# Patient Record
Sex: MALE | Race: WHITE | NOT HISPANIC OR LATINO | URBAN - METROPOLITAN AREA
[De-identification: names, ages, dates, MRNs, and addresses within clinical notes are randomized per-mention and may not be internally consistent; named-entity substitution may affect disease eponyms.]

---

## 2019-07-31 ENCOUNTER — OFFICE VISIT - RIVER FALLS (OUTPATIENT)
Dept: FAMILY MEDICINE | Facility: CLINIC | Age: 79
End: 2019-07-31

## 2020-01-01 ENCOUNTER — COMMUNICATION - RIVER FALLS (OUTPATIENT)
Dept: FAMILY MEDICINE | Facility: CLINIC | Age: 80
End: 2020-01-01

## 2022-02-12 VITALS — HEART RATE: 68 BPM | DIASTOLIC BLOOD PRESSURE: 60 MMHG | SYSTOLIC BLOOD PRESSURE: 112 MMHG

## 2022-02-15 NOTE — TELEPHONE ENCOUNTER
---------------------  From: John Velasquez MD   Sent: 2020 11:00:46 AM CST  Subject: patient      Called and spoke with staff at [facility]. Patient had been on hospice due to dystonia. Tested positive for COVID-19 on 20. No further cares requested by family due to hospice status, comfort care continued. Passed away from COVID-19 on 20. Death certificate completed.

## 2022-02-15 NOTE — TELEPHONE ENCOUNTER
---------------------  From: Yelena Inman CMA (eRx Pool ([address]_[place]))   To: John Velasquez MD;     Sent: 2/24/2020 4:54:26 PM CST  Subject: FW: Medication Management   Due Date/Time: 2/25/2020 3:13:00 PM CST     PCP:   [facility]    Medication:   Acetaminophen 500mg  Last Filled:  1/28/2020    Quantity:  60  Refills:      Date of last office visit and reason:     Date of last labs pertaining to condition:      Note:  Refill request from Cely Baird, Patient on hospice and is at [facility]    Return to Clinic order placed?  _    Resource:   _  Phone:   _         ** Patient matched by Yelena Inman CMA on 2/24/2020 4:51:07 PM CST **      ------------------------------------------  From: [facility] [address] [facility]  To: John Velasquez MD  Sent: February 24, 2020 3:13:39 PM CST  Subject: Medication Management  Due: February 25, 2020 3:13:39 PM CST    ** On Hold Pending Signature **  Drug: acetaminophen (acetaminophen 500 mg oral tablet)  TAKE 2 TABS (1000MG) BY MOUTH TWICE DAILY FOR PAIN  Quantity: 60 cap(s)  Days Supply: 30  Refills: 0  Substitutions Allowed  Notes from Pharmacy:     Dispensed Drug: acetaminophen (acetaminophen 500 mg oral tablet)  TAKE 2 TABS (1000MG) BY MOUTH TWICE DAILY FOR PAIN  Quantity: 60 cap(s)  Days Supply: 30  Refills: 0  Substitutions Allowed  Notes from Pharmacy:   ---------------------------------------------------------------  From: John Velasquez MD   To: [facility] [facility]    Sent: 2/24/2020 5:10:49 PM CST  Subject: FW: Medication Management     ** Submitted: **  Complete:acetaminophen (Mapap 500 mg oral tablet)   Signed by John Velasquez MD  2/24/2020 5:10:00 PM    ** Approved with modifications: **  acetaminophen (ACETAMINOPHEN ES 500MG CAPLET)  TAKE 2 TABS (1000MG) BY MOUTH TWICE DAILY FOR PAIN  Qty:  60 cap(s)        Days Supply:  30        Refills:  11          Substitutions Allowed     Route To Pharmacy - [facility] [facility]

## 2022-02-15 NOTE — TELEPHONE ENCOUNTER
Entered by Yelena Inman CMA on August 18, 2020 2:35:34 PM CDT  ---------------------  From: Yelena Inman CMA   To: [facility] [address] [facility]    Sent: 8/18/2020 2:35:34 PM CDT  Subject: Medication Management     ** Submitted: **  Order:acetaminophen (acetaminophen 500 mg oral tablet)  2 tab(s)  Oral  bid  FOR PAIN.  Qty:  60 cap(s)        Days Supply:  3        Refills:  1          Substitutions Allowed     Route To Pharmacy - [facility] [facility]    Signed by Yelena Inman CMA  8/18/2020 7:35:00 PM UT    ** Submitted: **  Complete:acetaminophen (acetaminophen 500 mg oral tablet)   Signed by Yelena Inman CMA  8/18/2020 7:35:00 PM [facility] [[email or web address]]    ** Not Approved:  **  acetaminophen (ACETAMINOPHEN ES 500MG CAPLET)  TAKE 2 TABS (1000MG) BY MOUTH TWICE DAILY FOR PAIN  Qty:  60 cap(s)        Days Supply:  3        Refills:  0          Substitutions Allowed     Route To Pharmacy - [facility] [address] [facility]   Note from Pharmacy:  NEED REFILLS PLEASE--THANK YOU PT IS OUT  Signed by Yelena Inman CMA            ------------------------------------------  From: [place] [id number]  To: John Velasquez MD  Sent: August 18, 2020 2:25:36 PM CDT  Subject: Medication Management  Due: August 12, 2020 4:17:26 PM CDT     ** On Hold Pending Signature **     Drug: acetaminophen (acetaminophen 500 mg oral tablet), TAKE 2 TABS (1000MG) BY MOUTH TWICE DAILY FOR PAIN  Quantity: 60 cap(s)  Days Supply: 3  Refills: 0  Substitutions Allowed  Notes from Pharmacy: NEED REFILLS PLEASE--THANK YOU PT IS OUT     Dispensed Drug: acetaminophen (acetaminophen 500 mg oral tablet), TAKE 2 TABS (1000MG) BY MOUTH TWICE DAILY FOR PAIN  Quantity: 60 cap(s)  Days Supply: 3  Refills: 0  Substitutions Allowed  Notes from Pharmacy: NEED REFILLS PLEASE--THANK YOU PT IS OUT  ------------------------------------------

## 2022-02-15 NOTE — TELEPHONE ENCOUNTER
---------------------  From: Luz Yi CMA (eRx Pool ([id number]_[place]))   To: John Velasquez MD;     Sent: 4/23/2020 8:05:23 AM CDT  Subject: FW: Medication Management   Due Date/Time: 4/23/2020 11:37:00 AM CDT           ------------------------------------------  From: [facility] [address]  To: John Velasquez MD  Sent: April 22, 2020 11:37:45 AM CDT  Subject: Medication Management  Due: April 23, 2020 11:37:45 AM CDT    ** On Hold Pending Signature **  Drug: traMADol (traMADol 50 mg oral tablet)  TAKE 1 TAB BY MOUTH EVERY 6 HOURS AS NEEDED FOR PAIN  Quantity: 120 tab(s)  Days Supply: 8  Refills: 0  Substitutions Allowed  Notes from Pharmacy: NEED NEW SCRIPT PLEASE - THANK YOU    Dispensed Drug: traMADol (traMADol 50 mg oral tablet)  TAKE 1 TAB BY MOUTH EVERY 6 HOURS AS NEEDED FOR PAIN  Quantity: 120 tab(s)  Days Supply: 8  Refills: 0  Substitutions Allowed  Notes from Pharmacy: NEED NEW SCRIPT PLEASE - THANK YOU  ---------------------------------------------------------------  From: John Velasquez MD   To: [facility] [address]    Sent: 4/23/2020 9:14:58 AM CDT  Subject: FW: Medication Management     ** Submitted: **  Complete:traMADol (traMADol 50 mg oral tablet)   Signed by John Velasquez MD  4/23/2020 2:14:00 PM    ** Approved with modifications: **  traMADol (TRAMADOL 50MG TABLET)  TAKE 1 TAB BY MOUTH EVERY 6 HOURS AS NEEDED FOR PAIN  Qty:  120 tab(s)        Days Supply:  8        Refills:  5          Substitutions Allowed     Route To Pharmacy - [id number]   Note from Pharmacy:  NEED NEW SCRIPT PLEASE - THANK YOU

## 2022-02-15 NOTE — PROGRESS NOTES
Patient:   AUSTRUM, DUANE C            MRN: [id number]            FIN: [id number]               Age:   78 years     Sex:  Male     :  1940   Associated Diagnoses:   Contracture of ankle; Dystonia; Contracture of left hand   Author:   John Velasquez MD      Visit Information      Primary Care Provider (PCP):  John Velasquez MD    NPI# [id number]      Chief Complaint   2019 2:48 PM CDT    Going off of Hospice; Face to Face orders needed; look at hands and feet      History of Present Illness   patient is long term resident at [facility]  has advanced dystonia, wheelchair confined with little use of hands or legs  had been on hospice but has been clinically stable recently so hospice is discontinued  comfort care remains the goal of patient and family, significant other is with him today  he is very difficult to transport  staff has noted that left middle finger has developed a deformity, not painful, not swelling, hands are contracted  needs refills of medications, no concerns about current medications      Health Status   Allergies:    Allergic Reactions (All)  Severity Not Documented  Penicillin (No reactions were documented)   Medications:  (Selected)   Prescriptions  Prescribed  ACETAMINOPHEN 500 MG TABLET: 2 tab(s), Oral, bid, Instructions: FOR PAIN., # 120 tab(s), 11 Refill(s), Type: Soft Stop, Pharmacy: [facility] [address] [place] [address]  GlycoLax oral powder for reconstitution: See Instructions, Instructions: DISSOLVE 17 GRAMS IN 8oz OF LIQUID AND TAKE BY MOUTH ONCE DAILY, # 527 gm, 1 Refill(s), Type: Maintenance, Pharmacy: [facility] [address] [place] [address]  Lipitor 20 mg oral tablet: 1 tab(s) ( 20 mg ), PO, Daily, # 90 tab(s), 3 Refill(s), Type: Maintenance, Pharm[facility] [id number], 1 tab(s) po daily,x90 day(s)  Mapap 500 mg oral tablet: See Instructions, Instructions: TAKE 2 TABLETS BY MOUTH EVERY DAY, # 60 tab(s), 5 Refill(s), Type: Soft Stop, Pharmacy: [facility]  Senna 8.6 mg oral tablet: 1 tab(s) ( 8.6 mg ), po, bid, PRN: as needed for constipation, # 180 tab(s), 2 Refill(s), Type: Maintenance, Pharmacy: [facility] [id number], 1 tab(s) po bid  Vistaril 25 mg oral capsule: 1 cap(s) ( 25 mg ), PO, QID, Instructions: benefit outweighs risk, PRN: for itching, # 40 cap(s), 0 Refill(s), Type: Maintenance  aspirin 81 mg oral tablet: 1 tab(s) ( 81 mg ), PO, Daily, Instructions: with food  for prevention, # 90 tab(s), 0 Refill(s), Type: Maintenance, OTC (Rx)  baclofen 10 mg oral tablet: 1 tab(s), Oral, tid, # 90 tab(s), 5 Refill(s), Type: Soft Stop, Pharmacy: [address]  brace for left foot contractures: brace for left foot contractures, See Instructions, Instructions: evaluate and treat as appropriate  for left foot contractures, Supply, # 1 EA, 0 Refill(s), Type: Maintenance  buPROPion 75 mg oral tablet: = 2 tab(s), Oral, bid, Instructions: FOR DEPRESSION., # 120 tab(s), 3 Refill(s), Type: Maintenance, Pharmacy: [address]  citalopram 20 mg oral tablet: 1 tab(s), Oral, daily, Instructions: FOR DEPRESSION., # 30 tab(s), 12 Refill(s), Type: Soft Stop, Pharmacy: [address]  eszopiclone 3 mg oral tablet: 1 tab(s) ( 3 mg ), po, hs, # 30 tab(s), 5 Refill(s), Type: Soft Stop  furosemide 20 mg oral tablet: 1 tab(s), Oral, daily, Instructions: FOR HYPERTENSION., # 30 tab(s), 12 Refill(s), Type: Soft Stop, Pharmacy: [address]  gabapentin 300 mg oral capsule: = 2 cap(s), Oral, tid, Instructions: FOR PVD., # 180 cap(s), 3 Refill(s), Type: Maintenance, Pharmacy: [facility] [facility]  traMADol 50 mg oral tablet: 1 tab(s) ( 50 mg ), po, q6 hrs, PRN: for pain, # 120 tab(s), 5 Refill(s), Type: Maintenance, Pharmacy: [facility] [facility], 1 tab(s) po q6 hrs,PRN:for pain   Problem list:    All Problems (Selected)  Anxiety / ICD-9-.00 / Confirmed  Cancer of prostate  / ICD-9- / Confirmed  COPD (Chronic Obstructive Pulmonary Disease) / ICD-9- / Confirmed  Dystonia / ICD-9-.0 / Confirmed  Ed (Erectile Dysfunction) / ICD-9-.84 / Confirmed  Full care by hospice / SNOMED CT 338720431 / Confirmed  Insomnia / SNOMED CT 564630212 / Confirmed  Loss of memory / SNOMED CT 461273516 / Confirmed  Obesity / ICD-9-.00 / Probable  PUD (Peptic Ulcer Disease) / ICD-9-.90 / Confirmed  PVD (Peripheral Vascular Disease) / ICD-9-.9 / Confirmed      Histories   Past Medical History:    Active  COPD (Chronic Obstructive Pulmonary Disease) (ICD-9-)  PUD (Peptic Ulcer Disease) (ICD-9-.90)  Ed (Erectile Dysfunction) (ICD-9-.84)  PVD (Peripheral Vascular Disease) (ICD-9-.9)  Insomnia (SNOMED CT 557315250)  Anxiety (ICD-9-.00)  Obesity (ICD-9-.00)  Resolved  Tobacco abuse (SNOMED CT 931738880):  Resolved.  Comments:  3/23/2010 CDT 11:01 AM CDT - Jody Rivera CMA   Family History:    Breast cancer  Mother ()  MI - Myocardial infarction  Father ()     Procedure history:    Cystoscopy ([id number]) on 2013 at 72 Years.  Comments:  2013 9:43 AM CDT - Lesley Thompson  Suprapubic tube placement, cryoablation of the prostate.  Colonoscopy (890395720) on 2013 at 72 Years.  Comments:  3/11/2013 11:09 AM CDT - Yadi Julio MA  Tubular adenoma repeat in 5 years with MAC sedation  Repair of perforated pyloric ulcer (420263936) in the month of 2003 at 63 Years.  Surgical fixation of bimalleolar fracture right on 2002 at 62 Years.  Transurethral cryotherapy of prostate (882634364).   Social History:        Alcohol Assessment: Current            Current, Beer (12 oz), 1-2 times per week, 5 drinks/episode average.  10 drinks/episode maximum.                     Comments:                      2011 - Jaylene Egan.      Tobacco Assessment: Past            20  per day.  30 year(s).      Substance Abuse Assessment: Denies Substance Abuse      Employment and Education Assessment            Retired from [employer]      Home and Environment Assessment            Marital status: Life Partner.  Spouse/Partner name: Glendy Carey.            Spouse/Partner name: Zoe Carey.      Nutrition and Health Assessment            Type of diet: Regular.      Exercise and Physical Activity Assessment: Does not exercise      Physical Examination   Vital Signs   7/31/2019 2:48 PM CDT Peripheral Pulse Rate 68 bpm    Systolic Blood Pressure 112 mmHg    Diastolic Blood Pressure 60 mmHg    Mean Arterial Pressure 77 mmHg      Measurements from flowsheet : Measurements   7/31/2019 2:48 PM CDT    Ht/Wt Measurement Refused by Patient?     No     General:  alert cooperative, no distress, in wheelchair, left hand and legs with contractures.    Eye:  Pupils are equal, round and reactive to light, Normal conjunctiva.    HENT:  Normocephalic, Tympanic membranes are clear, Oral mucosa is moist.    Neck:  Supple, Non-tender, No lymphadenopathy.    Respiratory:  Lungs are clear to auscultation, Respirations are non-labored.    Cardiovascular:  Normal rate, Regular rhythm.    Gastrointestinal:  Soft, Non-tender, Normal bowel sounds.    Psychiatric:  Cooperative, Appropriate mood & affect.       Impression and Plan   Diagnosis     Contracture of ankle (LBY27-DK M24.573).     Dystonia (HJW01-ZB G24.9).     Contracture of left hand (AUI06-UV M24.542).     Plan:  patient remains comfort care, offered consideration of consultation with physical therapy and occupational therapy for braces and to evaluate wheelchair, but patient declines and significant other notes that he does ot show significant discomfort. Continue current medications. Recheck in one year..

## 2022-02-15 NOTE — TELEPHONE ENCOUNTER
---------------------  From: Yelena Inman CMA (Phone Messages Pool ([address]_[place]))   To: John Velasquez MD;     Sent: 8/1/2019 10:22:43 AM CDT  Subject: Phone Message: Tramadol     PCP:   [facility]    Time of Call:  10:15am       Person Calling:  Karol RN - [facility]  Phone number:  [phone]  Leave a detailed Message:     Returned call at:     Note:   Karol called requesting a refill of patients Tramadol 50mg, she states that the pharmacy had faxed us an order for [facility] to sign and they had not heard back. Karol also stated that they would need a hard copy to keep on file at [facility] for there records. Please Advise    Last office visit and reason:  7/31/19     Pharmacy: [facility] to: [facility]  ** Submitted: **  Order:traMADol (traMADol 50 mg oral tablet)  1 tab(s)  po  q6 hrs  Qty:  120 tab(s)        Refills:  5          Substitutions Allowed     PRN  for pain      Route To Pharmacy - [facility]    Signed by John Velasquez MD  8/1/2019 12:28:00 PM---------------------  From: John Velasquez MD   To: Phone Messages Pool ([address]_[place]);     Sent: 8/1/2019 12:29:10 PM CDT  Subject: RE: Phone Message: Tramadol     doneFaxed it over and called [facility] to let them know.

## 2022-02-15 NOTE — TELEPHONE ENCOUNTER
"---------------------  From: Margoth Priest CMA   Sent: 10/30/2020 7:21:23 AM CDT  Subject: refill     Fax from [facility] requesting refill for citalopram. Note stating \"not been received\". Pt currently on hospice. Refilled x 6 mo.  "

## 2022-02-15 NOTE — LETTER
(Inserted Image. Unable to display)   July 30, 2020      DUANE AUSTRUM  [address] [place] [address]        Dear DUANE,      Thank you for selecting [facility] (previously [facility]) for your healthcare needs.     Our records indicate you are due for the following services:     Medication Check    To schedule an appointment or if you have further questions, please contact your primary clinic:   [facility]          [phone]   [facility]    [phone]             [facility]         [phone]      Powered by [employer]    Sincerely,    John Velasquez M.D.

## 2022-02-15 NOTE — TELEPHONE ENCOUNTER
---------------------  From: Shruthi Todd CMA (eRx Pool ([address]_[facility]))   To: John Velasquez MD;     Sent: 8/11/2020 11:05:43 AM CDT  Subject: General Message     PA request received from [facility].    Baclofen 10mg tabs. Please advise if you would like PA completed or alternative.  last prescribed 09/2019    key: [id number]- covermymedsprior authorization please---------------------  From: John Velasquez MD   To: eRx Pool ([address]_[place]);     Sent: 8/11/2020 11:26:39 AM CDT  Subject: RE: General Message---------------------  From: Shruthi Todd CMA (eRx Pool ([address]_[facility]))   To: Yelena Inman CMA;     Sent: 8/11/2020 11:38:17 AM CDT  Subject: FW: General Message     I attempted to complete PA in covermymeds, but it states patient information is wrong. I filled in as much as I could, but can you verify patient address/phone number? Thanks!---------------------  From: Yelena Inman CMA   To: eRx Pool ([address]);     Sent: 8/11/2020 11:46:42 AM CDT  Subject: RE: General Message     I think I got it to go through.     Yelena SCHROEDER CMA

## 2022-02-15 NOTE — NURSING NOTE
Comprehensive Intake Entered On:  7/31/2019 2:54 PM CDT    Performed On:  7/31/2019 2:48 PM CDT by Henny CORTES Yelena               Summary   Systolic Blood Pressure :   112 mmHg   Diastolic Blood Pressure :   60 mmHg   Mean Arterial Pressure :   77 mmHg   Peripheral Pulse Rate :   68 bpm   Yelena Inman CMA - 7/31/2019 2:54 PM CDT   Chief Complaint :   Going off of Hospice; Face to Face orders needed; look at hands and feet   Advance Directive :   Yes   Ht/Wt Measurement Refused by Patient? :   No   Yelena Inman CMA - 7/31/2019 2:48 PM CDT   Health Status   Allergies Verified? :   Yes   Medication History Verified? :   Yes   Immunizations Current :   Yes   Medical History Verified? :   Yes   Pre-Visit Planning Status :   Completed   Tobacco Use? :   Former smoker   Ronny Inman CMAsha - 7/31/2019 2:48 PM CDT   Consents   Consent for Immunization Exchange :   Consent Granted   Consent for Immunizations to Providers :   Consent Granted   Young Inman CMAa - 7/31/2019 2:48 PM CDT   Meds / Allergies   (As Of: 7/31/2019 2:54:05 PM CDT)   Allergies (Active)   penicillin  Estimated Onset Date:   Unspecified ; Created By:   Jody Mendez; Reaction Status:   Active ; Category:   Drug ; Substance:   penicillin ; Type:   Allergy ; Updated By:   Jody Mendez; Source:   Paper Chart ; Reviewed Date:   7/31/2019 2:49 PM CDT        Medication List   (As Of: 7/31/2019 2:54:05 PM CDT)   Prescription/Discharge Order    Mapap 500 mg oral tablet  :   Mapap 500 mg oral tablet ; Status:   Prescribed ; Ordered As Mnemonic:   Mapap 500 mg oral tablet ; Simple Display Line:   See Instructions, TAKE 2 TABLETS BY MOUTH EVERY DAY, 60 tab(s), 5 Refill(s) ; Ordering Provider:   John Velasquez MD; Catalog Code:   acetaminophen ; Order Dt/Tm:   5/31/2018 4:54:27 PM          aspirin  :   aspirin ; Status:   Prescribed ; Ordered As Mnemonic:   aspirin 81 mg oral tablet ; Simple Display Line:   81 mg, 1 tab(s), PO, Daily, with food   for prevention, 90 tab(s), 0 Refill(s) ; Ordering Provider:   John Velasquez MD; Catalog Code:   aspirin ; Order Dt/Tm:   12/17/2013 2:14:20 PM          atorvastatin  :   atorvastatin ; Status:   Prescribed ; Ordered As Mnemonic:   Lipitor 20 mg oral tablet ; Simple Display Line:   20 mg, 1 tab(s), PO, Daily, 90 tab(s) ; Ordering Provider:   John Velasquez MD; Catalog Code:   atorvastatin ; Order Dt/Tm:   2/12/2014 2:29:08 PM          baclofen 10 mg oral tablet  :   baclofen 10 mg oral tablet ; Status:   Prescribed ; Ordered As Mnemonic:   baclofen 10 mg oral tablet ; Simple Display Line:   1 tab(s), Oral, tid, 90 tab(s), 5 Refill(s) ; Ordering Provider:   John Velasquez MD; Catalog Code:   baclofen ; Order Dt/Tm:   5/20/2019 2:52:20 PM          buPROPion  :   buPROPion ; Status:   Prescribed ; Ordered As Mnemonic:   buPROPion 75 mg oral tablet ; Simple Display Line:   2 tab(s), Oral, bid, FOR DEPRESSION., 120 tab(s), 3 Refill(s) ; Ordering Provider:   John Velasquez MD; Catalog Code:   buPROPion ; Order Dt/Tm:   5/30/2019 2:30:52 PM          citalopram 20 mg oral tablet  :   citalopram 20 mg oral tablet ; Status:   Prescribed ; Ordered As Mnemonic:   citalopram 20 mg oral tablet ; Simple Display Line:   1 tab(s), Oral, daily, FOR DEPRESSION., 30 tab(s), 12 Refill(s) ; Ordering Provider:   John Velasquez MD; Catalog Code:   citalopram ; Order Dt/Tm:   6/26/2019 3:38:01 PM          eszopiclone  :   eszopiclone ; Status:   Prescribed ; Ordered As Mnemonic:   eszopiclone 3 mg oral tablet ; Simple Display Line:   3 mg, 1 tab(s), po, hs, 30 tab(s), 5 Refill(s) ; Ordering Provider:   John Velasquez MD; Catalog Code:   eszopiclone ; Order Dt/Tm:   9/6/2017 11:16:56 AM          furosemide 20 mg oral tablet  :   furosemide 20 mg oral tablet ; Status:   Prescribed ; Ordered As Mnemonic:   furosemide 20 mg oral tablet ; Simple Display Line:   1 tab(s), Oral, daily, FOR HYPERTENSION., 30 tab(s), 12 Refill(s) ;  Ordering Provider:   John Velasquez MD; Catalog Code:   furosemide ; Order Dt/Tm:   6/26/2019 3:38:01 PM          gabapentin  :   gabapentin ; Status:   Prescribed ; Ordered As Mnemonic:   gabapentin 300 mg oral capsule ; Simple Display Line:   2 cap(s), Oral, tid, FOR PVD., 180 cap(s), 3 Refill(s) ; Ordering Provider:   John Velasquez MD; Catalog Code:   gabapentin ; Order Dt/Tm:   5/30/2019 2:31:24 PM          hydrOXYzine  :   hydrOXYzine ; Status:   Prescribed ; Ordered As Mnemonic:   Vistaril 25 mg oral capsule ; Simple Display Line:   25 mg, 1 cap(s), PO, QID, benefit outweighs risk, 40 cap(s), PRN: for itching ; Ordering Provider:   John Velasquez MD; Catalog Code:   hydrOXYzine ; Order Dt/Tm:   5/19/2015 6:02:14 PM          Misc Prescription  :   Misc Prescription ; Status:   Prescribed ; Ordered As Mnemonic:   ACETAMINOPHEN 500 MG TABLET ; Simple Display Line:   2 tab(s), Oral, bid, FOR PAIN., 120 tab(s), 11 Refill(s) ; Ordering Provider:   John Velasquez MD; Catalog Code:   Miscellaneous Prescription ; Order Dt/Tm:   3/5/2019 11:32:06 AM          Miscellaneous Rx Supply  :   Miscellaneous Rx Supply ; Status:   Prescribed ; Ordered As Mnemonic:   brace for left foot contractures ; Simple Display Line:   See Instructions, evaluate and treat as appropriate  for left foot contractures, 1 EA ; Ordering Provider:   John Velasquez MD; Catalog Code:   Miscellaneous Rx Supply ; Order Dt/Tm:   5/19/2015 6:03:10 PM          polyethylene glycol 3350  :   polyethylene glycol 3350 ; Status:   Prescribed ; Ordered As Mnemonic:   GlycoLax oral powder for reconstitution ; Simple Display Line:   See Instructions, DISSOLVE 17 GRAMS IN 8oz OF LIQUID AND TAKE BY MOUTH ONCE DAILY, 527 gm, 1 Refill(s) ; Ordering Provider:   John Velasquez MD; Catalog Code:   polyethylene glycol 3350 ; Order Dt/Tm:   5/14/2018 10:48:48 AM          senna  :   senna ; Status:   Prescribed ; Ordered As Mnemonic:   Senna 8.6 mg oral tablet  ; Simple Display Line:   8.6 mg, 1 tab(s), po, bid, PRN: as needed for constipation, 180 tab(s), 2 Refill(s) ; Ordering Provider:   John Velasquez MD; Catalog Code:   senna ; Order Dt/Tm:   2/12/2014 2:33:06 PM          traMADol  :   traMADol ; Status:   Prescribed ; Ordered As Mnemonic:   traMADol 50 mg oral tablet ; Simple Display Line:   50 mg, 1 tab(s), po, q6 hrs, PRN: for pain, 120 tab(s), 5 Refill(s) ; Ordering Provider:   Arden Wilson PA-C; Catalog Code:   traMADol ; Order Dt/Tm:   6/1/2018 9:44:40 AM

## 2022-02-15 NOTE — TELEPHONE ENCOUNTER
Entered by John Velasquez MD on September 22, 2020 9:26:17 AM CDT  Patient on hospice at assisted living. OK for refills      ------------------------------------------  From: [id number]  To: John Velasquez MD  Sent: September 21, 2020 6:27:48 PM CDT  Subject: Medication Management  Due: September 9, 2020 4:20:39 PM CDT     ** On Hold Pending Signature **     Drug: gabapentin (gabapentin 300 mg oral capsule), TAKE 1 TAB BY MOUTH 3 TIMES DAILY.  Quantity: 540 tab(s)  Days Supply: 14  Refills: 0  Substitutions Allowed  Notes from Pharmacy:     Dispensed Drug: gabapentin (gabapentin 600 mg oral tablet), TAKE 1 TAB BY MOUTH 3 TIMES DAILY.  Quantity: 270 tab(s)  Days Supply: 14  Refills: 0  Substitutions Allowed  Notes from Pharmacy:  ---------------------------------------------------------------  From: John Velasquez MD   To: [id number]    Sent: 9/22/2020 9:26:28 AM CDT  Subject: Medication Management     ** Submitted: **  Complete:gabapentin (gabapentin 300 mg oral capsule)   Signed by John Velasquez MD  9/22/2020 2:26:00 PM [facility], [place]    ** Approved with modifications: **  gabapentin (GABAPENTIN 600MG TABLET)  TAKE 1 TAB BY MOUTH 3 TIMES DAILY.  Qty:  270 tab(s)        Days Supply:  14        Refills:  3          Substitutions Allowed     Route To Pharmacy - [id number]